# Patient Record
Sex: MALE | Race: WHITE | URBAN - METROPOLITAN AREA
[De-identification: names, ages, dates, MRNs, and addresses within clinical notes are randomized per-mention and may not be internally consistent; named-entity substitution may affect disease eponyms.]

---

## 2021-03-03 ENCOUNTER — AMBULATORY - RIVER FALLS (OUTPATIENT)
Dept: FAMILY MEDICINE | Facility: CLINIC | Age: 76
End: 2021-03-03

## 2021-05-25 ENCOUNTER — RECORDS - HEALTHEAST (OUTPATIENT)
Dept: ADMINISTRATIVE | Facility: CLINIC | Age: 76
End: 2021-05-25

## 2022-02-16 NOTE — TELEPHONE ENCOUNTER
---------------------  From: Angela Mcghee   To: Daniel RICHARD, Darin;     Sent: 3/3/2021 8:19:59 AM CST  Subject: HST     Patient has a home sleep study uploaded into [email or web address] ready for interpretation

## 2022-02-16 NOTE — TELEPHONE ENCOUNTER
---------------------  From: Angela Mcghee   To: Darin Sanchez MD;     Sent: 3/10/2021 2:33:54 PM CST  Subject: HST     Aiyana Blackmon from [facility] called and was wondering about patient HST reading. She is seeing an AHI of less than 5 but  your interpretation is showing an AHI of 6. She is wondering what it should be or if he needs to retest.    Thanks---------------------  From: Darin Sanchez MD   To: Angela Mcghee;     Sent: 3/10/2021 3:03:24 PM CST  Subject: RE: HST     i would be glad to call and discuss.  The testing was compromised by poor flow monitoring so I did a manual correction to the computer read and his result is 6 AHI consistent with mild ULICES---------------------  From: Angela Mcghee   To: Darin Sanchez MD;     Sent: 3/11/2021 8:40:05 AM CST  Subject: RE: HST     I reached out to [facility] for the direct contact number for her I will get that to you as soon as I have it.    Thanks---------------------  From: Darin Sanchez MD   To: Angela Mcghee;     Sent: 3/11/2021 9:55:01 AM CST  Subject: RE: HST     i did call and talk to the provider,   thanks though